# Patient Record
Sex: MALE | Race: OTHER | NOT HISPANIC OR LATINO
[De-identification: names, ages, dates, MRNs, and addresses within clinical notes are randomized per-mention and may not be internally consistent; named-entity substitution may affect disease eponyms.]

---

## 2023-05-18 ENCOUNTER — RX ONLY (RX ONLY)
Age: 23
End: 2023-05-18

## 2023-05-19 ENCOUNTER — APPOINTMENT (OUTPATIENT)
Dept: URBAN - METROPOLITAN AREA CLINIC 207 | Age: 23
Setting detail: DERMATOLOGY
End: 2023-05-28

## 2023-05-19 DIAGNOSIS — L29.8 OTHER PRURITUS: ICD-10-CM

## 2023-05-19 DIAGNOSIS — L40.8 OTHER PSORIASIS: ICD-10-CM

## 2023-05-19 PROBLEM — L30.9 DERMATITIS, UNSPECIFIED: Status: ACTIVE | Noted: 2023-05-19

## 2023-05-19 PROCEDURE — OTHER COUNSELING: OTHER

## 2023-05-19 PROCEDURE — OTHER MIPS QUALITY: OTHER

## 2023-05-19 PROCEDURE — 99204 OFFICE O/P NEW MOD 45 MIN: CPT

## 2023-05-19 PROCEDURE — OTHER PRESCRIPTION MEDICATION MANAGEMENT: OTHER

## 2023-05-19 PROCEDURE — OTHER PATIENT SPECIFIC COUNSELING: OTHER

## 2023-05-19 PROCEDURE — OTHER PRESCRIPTION: OTHER

## 2023-05-19 RX ORDER — ROFLUMILAST 3 MG/G
CREAM TOPICAL
Qty: 60 | Refills: 3 | Status: ERX | COMMUNITY
Start: 2023-05-19

## 2023-05-19 RX ORDER — DESONIDE 0.5 MG/G
CREAM TOPICAL
Qty: 60 | Refills: 0 | Status: ERX | COMMUNITY
Start: 2023-05-19

## 2023-05-19 ASSESSMENT — LOCATION DETAILED DESCRIPTION DERM: LOCATION DETAILED: SUPRAPUBIC SKIN

## 2023-05-19 ASSESSMENT — LOCATION SIMPLE DESCRIPTION DERM: LOCATION SIMPLE: GROIN

## 2023-05-19 ASSESSMENT — LOCATION ZONE DERM: LOCATION ZONE: TRUNK

## 2023-05-19 NOTE — PROCEDURE: COUNSELING
Detail Level: Detailed
Antihistamine Recommendations: allegra 180mg take 1 tablet in the morning \\n\\nzyrtec 10mg take 1 tablet at night
Detail Level: Simple

## 2023-05-19 NOTE — PROCEDURE: PRESCRIPTION MEDICATION MANAGEMENT
Render In Strict Bullet Format?: Yes
Detail Level: Zone
Plan: Clinically does not appear as folliculitis \\nInverse psoriasis vs. eczema\\nDiscussed nature of both conditions and reviewed waxing/waning of flares in detail\\n\\nRx: Desonide cream - apply to affected areas on groin twice a day for up to 2 weeks. Repeat for flares\\nRx: Zoryve cream - apply to affected areas on groin twice a day as needed (non-steroidal topical that is safe to use long term)\\n\\nDiscontinue wash for folliculitis \\nSwitch to CeraVe or Cetaphil cleansers\\n\\nPatient is leaving for 2 month trip\\nIf not under control once back in town, RTO, otherwise PRN

## 2023-05-19 NOTE — HPI: RASH
Is This A New Presentation, Or A Follow-Up?: Rash
Additional History: Patients PCP diagnosed rash as folliculitis and referred him to derm.